# Patient Record
Sex: FEMALE | Race: OTHER
[De-identification: names, ages, dates, MRNs, and addresses within clinical notes are randomized per-mention and may not be internally consistent; named-entity substitution may affect disease eponyms.]

---

## 2020-10-25 ENCOUNTER — HOSPITAL ENCOUNTER (EMERGENCY)
Dept: HOSPITAL 72 - EMR | Age: 25
Discharge: HOME | End: 2020-10-25
Payer: MEDICAID

## 2020-10-25 VITALS — SYSTOLIC BLOOD PRESSURE: 135 MMHG | DIASTOLIC BLOOD PRESSURE: 67 MMHG

## 2020-10-25 VITALS — HEIGHT: 64 IN | WEIGHT: 130 LBS | BODY MASS INDEX: 22.2 KG/M2
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
## 2020-10-25 DIAGNOSIS — G40.909: ICD-10-CM

## 2020-10-25 DIAGNOSIS — S00.212A: Primary | ICD-10-CM
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
## 2020-10-25 PROCEDURE — 99283 EMERGENCY DEPT VISIT LOW MDM: CPT

## 2020-10-25 NOTE — EMERGENCY ROOM REPORT
History of Present Illness


General


Chief Complaint:  Eye Problems


Source:  Patient





Present Illness


HPI


23 YO female with pmhx of seizures, presents to the ED c/o 6 out of 10 severity 

pain to the left eye status post being poked in the face/eye by a twig while 

doing yard work prior to arrival.  Patient reports she is up-to-date with her 

tetanus vaccines.  Patient reports her bleeding is subsided at this time.  P

atient reports pain to the left eyelid and she states that she is worried that 

part of the stick went inside of her eye.  She denies visual changes or loss of 

vision. She denies floaters. She denies scratching sensation.


Allergies:  


Coded Allergies:  


     No Known Allergies (Unverified , 10/25/20)





COVID-19 Screening


Contact w/high risk pt:  No


Experienced COVID-19 symptoms?:  No


COVID-19 Testing performed PTA:  No





Patient History


Past Medical History:  see triage record


Past Surgical History:  none


Pertinent Family History:  none


Last Menstrual Period:  10/05/2020


Pregnant Now:  No


Reviewed Nursing Documentation:  PMH: Agreed; PSxH: Agreed





Nursing Documentation-PMH


Hx Seizures:  Yes





Review of Systems


All Other Systems:  negative except mentioned in HPI





Physical Exam





Vital Signs








  Date Time  Temp Pulse Resp B/P (MAP) Pulse Ox O2 Delivery O2 Flow Rate FiO2


 


10/25/20 16:46 98.1 87 20 135/67 (89) 98 Room Air  








Sp02 EP Interpretation:  reviewed, normal


General Appearance:  no apparent distress, alert, GCS 15, non-toxic


Head:  normocephalic, atraumatic


Eyes:  bilateral eye normal inspection, bilateral eye PERRL, bilateral eye EOMI,

bilateral eye visual acuity - 20/20 bilaterally, bilateral eye other - no 

fluoroscene uptake, NO visible FB on lid flip.  negative sidel sign.  normal 

conjunctiva. no photophobia.


ENT:  hearing grossly normal, normal voice


Neck:  full range of motion


Respiratory:  speaking full sentences


Cardiovascular #1:  regular rate, rhythm


Musculoskeletal:  normal range of motion, gait/station normal, non-tender


Neurologic:  alert, motor strength/tone normal, oriented x3, sensory intact, 

responsive, speech normal


Psychiatric:  judgement/insight normal


Skin:  abrasion - medial left eyelid. not bleeding at this time.





Medical Decision Making


PA Attestation


Dr. Ortega Is my supervising Physician whom patient management has been 

discussed with.


Diagnostic Impression:  


   Primary Impression:  


   Eyelid abrasion


   Qualified Codes:  S00.212A - Abrasion of left eyelid and periocular area, 

   initial encounter


ER Course


23 YO female with pmhx of seizures, presents to the ED c/o 6 out of 10 severity 

pain to the left eye status post being poked in the face/eye by a twig while 

doing yard work prior to arrival.  Patient reports she is up-to-date with her 

tetanus vaccines.  Patient reports her bleeding is subsided at this time.  

Patient reports pain to the left eyelid and she states that she is worried that 

part of the stick went inside of her eye.  She denies visual changes or loss of 

vision. She denies floaters. She denies scratching sensation. 





Ddx considered but are not limited to: corneal abrasion, acute glaucoma, globe 

rupture, FB, Corneal Ulcer, conjunctivitis. Iridis 





Vital signs: are WNL, pt. is afebrile 





H&PE are most consistent with:  Left eyelid abrasion





ORDERS: 


-Tetracaine and Fluorescein Stain of the Left eye: unremarkable no Increase 

fluorescein uptake, there is no involvement of the iris or pupil.  Negative 

[place] sign. There was negative evidence of Fb, deep ulcer, or rupture.   On lid

Flip: no obvious FB. 





ED INTERVENTIONS: none at this time. 








DISCHARGE: At this time pt. is stable for d/c to home. Will provide printed 

patient care instructions, and any necessary prescriptions. Care plan and follow

up instructions have been discussed with the patient prior to discharge. .





Last Vital Signs








  Date Time  Temp Pulse Resp B/P (MAP) Pulse Ox O2 Delivery O2 Flow Rate FiO2


 


10/25/20 16:46 98.1 87 20 135/67 (89) 98 Room Air  








Disposition:  HOME, SELF-CARE


Condition:  Stable


Scripts


Acetaminophen* (TYLENOL EXTRA STRENGTH*) 500 Mg Tablet


500 MG ORAL Q6H, #30 TAB 0 Refills


   Prov: Shiela Rutherford         10/25/20 


Erythromycin Base (ERYTHROMYCIN*) 3.5 Gm Oint...g.


1 APPLIC LEFT EYE BID, #3.5 GM 0 Refills


   Prov: Shiela Rutherford         10/25/20


Referrals:  


[facility]





[facility]


Departure Forms:  Return to Work      Return to Work Date:  Oct 27, 2020


      Return to Full Activity:  Oct 27, 2020


   Work Restrictions:  None


Patient Instructions:  Abrasion, Easy-to-Read





Additional Instructions:  


Take medications as directed. 


 ** Follow up with a Primary Care Provider in 3-5 days, even if your symptoms 

have resolved. ** 


--Please review list of primary care clinics, if you do not already have a 

primary care provider





Return sooner to ED if new symptoms occur, or current symptoms become worse. 











- Please note that this Emergency Department Report was dictated using [employer] - 3P technology software, occasionally this can lead to 

erroneous entry secondary to interpretation by the dictation equipment.











Shiela Rutherford              Oct 25, 2020 17:09